# Patient Record
Sex: MALE | Race: WHITE | Employment: FULL TIME | URBAN - METROPOLITAN AREA
[De-identification: names, ages, dates, MRNs, and addresses within clinical notes are randomized per-mention and may not be internally consistent; named-entity substitution may affect disease eponyms.]

---

## 2018-12-06 ENCOUNTER — OFFICE VISIT (OUTPATIENT)
Dept: NEUROLOGY | Facility: CLINIC | Age: 33
End: 2018-12-06
Payer: COMMERCIAL

## 2018-12-06 VITALS
HEART RATE: 60 BPM | BODY MASS INDEX: 26.6 KG/M2 | HEIGHT: 71 IN | SYSTOLIC BLOOD PRESSURE: 128 MMHG | DIASTOLIC BLOOD PRESSURE: 72 MMHG | RESPIRATION RATE: 17 BRPM | WEIGHT: 190 LBS

## 2018-12-06 DIAGNOSIS — M22.2X2 PATELLOFEMORAL PAIN SYNDROME OF LEFT KNEE: ICD-10-CM

## 2018-12-06 DIAGNOSIS — M76.52 PATELLAR TENDONITIS OF LEFT KNEE: Primary | ICD-10-CM

## 2018-12-06 PROCEDURE — 99204 OFFICE O/P NEW MOD 45 MIN: CPT | Performed by: PHYSICAL MEDICINE & REHABILITATION

## 2018-12-06 NOTE — PATIENT INSTRUCTIONS
-Please get a chopat patellar brace online  -Start physical therapy in [address] [facility] 2-3 xday for the next 3-4 weeks  -Aleve 2 tabs BID for the next 14 days and then as needed  -Follow up in 4 weeks or as needed      As of October 6th 2014, the [facility] will be picking up prescription, office must be given name of individual in advance and they must present an ID as well. · The name of the person picking up your prescription must be documented in your chart.

## 2018-12-06 NOTE — PROGRESS NOTES
[address] H&P      Chief Complaint (Reason for Visit):  Patient presents with:  Knee Pain: new patient here with chronic knee pain that has increased in intensity worse with physical activity.  feels pain negative for swelling  Skin: Negative. Neurological: Negative for paresthesias  Endo/Heme/Allergies: Negative. Psychiatric/Behavioral: Negative. All other systems reviewed and are negative.  Pertinent positives and negatives noted in the HPI the left side    Gait:  Normal    Data  No visits with results within 6 Month(s) from this visit. Latest known visit with results is:   No results found for any previous visit.   ]      Radiology Imaging:  No image results found.       ASSESSMENT AND PLAN [place]

## 2018-12-18 ENCOUNTER — MED REC SCAN ONLY (OUTPATIENT)
Dept: NEUROLOGY | Facility: CLINIC | Age: 33
End: 2018-12-18

## 2019-05-21 ENCOUNTER — OFFICE VISIT (OUTPATIENT)
Dept: FAMILY MEDICINE CLINIC | Facility: CLINIC | Age: 34
End: 2019-05-21
Payer: COMMERCIAL

## 2019-05-21 ENCOUNTER — APPOINTMENT (OUTPATIENT)
Dept: GENERAL RADIOLOGY | Facility: HOSPITAL | Age: 34
End: 2019-05-21
Attending: EMERGENCY MEDICINE
Payer: COMMERCIAL

## 2019-05-21 ENCOUNTER — HOSPITAL ENCOUNTER (EMERGENCY)
Facility: HOSPITAL | Age: 34
Discharge: HOME | End: 2019-05-21
Attending: EMERGENCY MEDICINE
Payer: COMMERCIAL

## 2019-05-21 VITALS
DIASTOLIC BLOOD PRESSURE: 77 MMHG | HEART RATE: 50 BPM | TEMPERATURE: 98 F | HEIGHT: 70 IN | WEIGHT: 190 LBS | OXYGEN SATURATION: 100 % | SYSTOLIC BLOOD PRESSURE: 118 MMHG | RESPIRATION RATE: 18 BRPM | BODY MASS INDEX: 27.2 KG/M2

## 2019-05-21 VITALS
BODY MASS INDEX: 26.6 KG/M2 | DIASTOLIC BLOOD PRESSURE: 77 MMHG | HEIGHT: 71 IN | TEMPERATURE: 98 F | SYSTOLIC BLOOD PRESSURE: 145 MMHG | WEIGHT: 190 LBS | HEART RATE: 50 BPM

## 2019-05-21 DIAGNOSIS — S60.551A FOREIGN BODY OF RIGHT HAND, INITIAL ENCOUNTER: ICD-10-CM

## 2019-05-21 DIAGNOSIS — S69.80XA HIGH PRESSURE INJURY OF HAND: Primary | ICD-10-CM

## 2019-05-21 DIAGNOSIS — S60.552A FOREIGN BODY OF LEFT HAND, INITIAL ENCOUNTER: Primary | ICD-10-CM

## 2019-05-21 PROCEDURE — 73130 X-RAY EXAM OF HAND: CPT | Performed by: EMERGENCY MEDICINE

## 2019-05-21 PROCEDURE — 99214 OFFICE O/P EST MOD 30 MIN: CPT | Performed by: PHYSICIAN ASSISTANT

## 2019-05-21 PROCEDURE — 99212 OFFICE O/P EST SF 10 MIN: CPT | Performed by: PHYSICIAN ASSISTANT

## 2019-05-21 PROCEDURE — 90471 IMMUNIZATION ADMIN: CPT

## 2019-05-21 PROCEDURE — 99284 EMERGENCY DEPT VISIT MOD MDM: CPT
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
## (undated) NOTE — ED AVS SNAPSHOT
Jennifer Shell   MRN: [id number]    Department:  [facility] Emergency Department   Date of Visit:  5/21/2019           Disclosure     Insurance plans vary and the physician(s) referred by the ER may not be covered by your plan.  Please contact yo CARE PHYSICIAN AT ONCE OR RETURN IMMEDIATELY TO THE EMERGENCY DEPARTMENT. If you have been prescribed any medication(s), please fill your prescription right away and begin taking the medication(s) as directed.   If you believe that any of the medications